# Patient Record
Sex: FEMALE | Race: BLACK OR AFRICAN AMERICAN | NOT HISPANIC OR LATINO | Employment: UNEMPLOYED | URBAN - METROPOLITAN AREA
[De-identification: names, ages, dates, MRNs, and addresses within clinical notes are randomized per-mention and may not be internally consistent; named-entity substitution may affect disease eponyms.]

---

## 2017-02-22 ENCOUNTER — HOSPITAL ENCOUNTER (OUTPATIENT)
Dept: RADIOLOGY | Facility: HOSPITAL | Age: 52
Discharge: HOME | End: 2017-02-22
Attending: INTERNAL MEDICINE
Payer: MEDICAID

## 2017-02-22 DIAGNOSIS — R79.89 ABNORMAL LIVER FUNCTION TESTS: ICD-10-CM

## 2017-02-22 PROCEDURE — A9585 GADOBUTROL INJECTION: HCPCS | Performed by: INTERNAL MEDICINE

## 2017-02-22 PROCEDURE — 74183 MRI ABD W/O CNTR FLWD CNTR: CPT | Mod: TC

## 2017-02-22 PROCEDURE — 25500020 PHARM REV CODE 255: Performed by: INTERNAL MEDICINE

## 2017-02-22 PROCEDURE — 74183 MRI ABD W/O CNTR FLWD CNTR: CPT | Mod: 26,,, | Performed by: RADIOLOGY

## 2017-02-22 RX ORDER — GADOBUTROL 604.72 MG/ML
4 INJECTION INTRAVENOUS
Status: COMPLETED | OUTPATIENT
Start: 2017-02-22 | End: 2017-02-22

## 2017-02-22 RX ADMIN — GADOBUTROL 4 ML: 604.72 INJECTION INTRAVENOUS at 03:02

## 2017-02-23 ENCOUNTER — TELEPHONE (OUTPATIENT)
Dept: HEPATOLOGY | Facility: CLINIC | Age: 52
End: 2017-02-23

## 2017-02-23 NOTE — TELEPHONE ENCOUNTER
----- Message from Tobin Ortiz MD sent at 2/23/2017  2:53 PM CST -----  Please let her know that her MRI did not show anything abnormal although they were unable to finish the test.

## 2017-02-24 ENCOUNTER — TELEPHONE (OUTPATIENT)
Dept: HEPATOLOGY | Facility: CLINIC | Age: 52
End: 2017-02-24

## 2017-02-24 NOTE — TELEPHONE ENCOUNTER
----- Message from Cassidy Segovia sent at 2/24/2017  2:03 PM CST -----  Contact: patient  States that whatever dr monahan have to tell her he can mail it to her pt couldn't make it to her appt today so she said just tell him to mail whatever it is to her

## 2017-03-15 DIAGNOSIS — R93.2 ABNORMAL LIVER DIAGNOSTIC IMAGING: Primary | ICD-10-CM

## 2017-03-20 NOTE — TELEPHONE ENCOUNTER
MA called patient to schedule her MRI, patient stated NO she will not do another MRI she stated that they messed up her VEIN last time she had MRI, they have hard time putting her IV on and she said that her VEIN are all messed up now. After that incident she said she always feel tingling on her arm. She wants to know what are we going to do now for her? Route message to Dr. Ortiz

## 2017-03-21 NOTE — TELEPHONE ENCOUNTER
MA called patient to schedule her follow up visit. Patient accepted 4/10/17 mailed appt reminder to patient. NICA

## 2017-04-04 ENCOUNTER — TELEPHONE (OUTPATIENT)
Dept: HEPATOLOGY | Facility: CLINIC | Age: 52
End: 2017-04-04

## 2017-04-04 NOTE — TELEPHONE ENCOUNTER
----- Message from Bebe Santacruz sent at 4/4/2017  2:40 PM CDT -----  Contact: pt  Calling to speak with Shaniqua if she can switch her appt from 4/10 to 4/11 with Dr Ortiz she is coming on 4/11 with her Mom to the clinic and wants to come on same day they live out of town please call her @ # [phone].

## 2017-04-04 NOTE — TELEPHONE ENCOUNTER
Ma CALLED patient back, patient is begging to be seen 4/11 instead of 4/10 patient stated that her mother who is her ride will be here 4/11 and her mother will not drive her 4/10.     Route message to MD.

## 2017-04-10 ENCOUNTER — TELEPHONE (OUTPATIENT)
Dept: HEPATOLOGY | Facility: CLINIC | Age: 52
End: 2017-04-10

## 2017-04-10 NOTE — TELEPHONE ENCOUNTER
MA called patient back, inform her that unfortunately we did not hear anything from MD. She is willing to reschedule her appt. She accepted 4/28/17 mailed appt reminder to patient.NICA

## 2017-04-10 NOTE — TELEPHONE ENCOUNTER
----- Message from Nicole Barroso MA sent at 4/7/2017  4:30 PM CDT -----  Contact: patient      ----- Message -----     From: Cassidy Segovia     Sent: 4/7/2017   4:22 PM       To: [facility] Non-Clinical Staff    Requesting a return call regarding appt that's on Tuesday need for you to call her back on Monday please call

## 2017-04-28 ENCOUNTER — OFFICE VISIT (OUTPATIENT)
Dept: HEPATOLOGY | Facility: CLINIC | Age: 52
End: 2017-04-28
Payer: MEDICAID

## 2017-04-28 VITALS
HEART RATE: 91 BPM | SYSTOLIC BLOOD PRESSURE: 142 MMHG | RESPIRATION RATE: 18 BRPM | WEIGHT: 142.44 LBS | BODY MASS INDEX: 24.32 KG/M2 | DIASTOLIC BLOOD PRESSURE: 78 MMHG | OXYGEN SATURATION: 100 % | HEIGHT: 64 IN | TEMPERATURE: 98 F

## 2017-04-28 DIAGNOSIS — K76.9 LIVER LESION: Primary | ICD-10-CM

## 2017-04-28 PROCEDURE — 99214 OFFICE O/P EST MOD 30 MIN: CPT | Mod: S$PBB,,, | Performed by: INTERNAL MEDICINE

## 2017-04-28 PROCEDURE — 99213 OFFICE O/P EST LOW 20 MIN: CPT | Mod: PBBFAC | Performed by: INTERNAL MEDICINE

## 2017-04-28 PROCEDURE — 99999 PR PBB SHADOW E&M-EST. PATIENT-LVL III: CPT | Mod: PBBFAC,,, | Performed by: INTERNAL MEDICINE

## 2017-04-28 RX ORDER — LISINOPRIL 20 MG/1
20 TABLET ORAL DAILY
Refills: 0 | COMMUNITY
Start: 2017-04-01

## 2017-04-28 NOTE — MR AVS SNAPSHOT
"    [facility] - Hepatology  [address]  [place] [address]  Phone: [phone]  Fax: [phone]                  Amira Rose   2017 4:20 PM   Office Visit    Description:  Female : 1965   Provider:  Tobin Ortiz MD   Department:  [facility]           Reason for Visit     Follow-up                To Do List           Goals (5 Years of Data)     None      [facility] On [facility]     [facility] On Call Nurse Care Line -  Assistance  Unless otherwise directed by your provider, please contact [facility], our nurse care line that is available for  assistance.     Registered nurses in the [facility] Center provide: appointment scheduling, clinical advisement, health education, and other advisory services.  Call: [phone] (toll free)               Medications           Message regarding Medications     Verify the changes and/or additions to your medication regime listed below are the same as discussed with your clinician today.  If any of these changes or additions are incorrect, please notify your healthcare provider.        STOP taking these medications     benazepril (LOTENSIN) 20 MG tablet     amlodipine-benazepril 10-20mg (LOTREL) 10-20 mg per capsule     amlodipine (NORVASC) 10 MG tablet Take 10 mg by mouth once daily.           Verify that the below list of medications is an accurate representation of the medications you are currently taking.  If none reported, the list may be blank. If incorrect, please contact your healthcare provider. Carry this list with you in case of emergency.           Current Medications     lisinopril (PRINIVIL,ZESTRIL) 20 MG tablet Take 20 mg by mouth once daily.    simvastatin (ZOCOR) 20 MG tablet            Clinical Reference Information           Your Vitals Were     BP Pulse Temp Resp Height Weight    142/78 (BP Location: Left arm, Patient Position: Sitting) 91 98.3 °F (36.8 °C) (Oral) 18 5' 4" (1.626 m) 64.6 kg (142 " lb 6.7 oz)    SpO2 BMI             100% 24.45 kg/m2         Blood Pressure          Most Recent Value    BP  (!)  142/78      Allergies as of 4/28/2017     No Known Allergies      Immunizations Administered on Date of Encounter - 4/28/2017     None      Maintenance Dialysis History     Patient has no recorded history of maintenance dialysis.      [facility] Sign-Up     Activating your [facility] account is as easy as 1-2-3!     1) Visit [email or web address], select Sign Up Now, enter this activation code and your date of birth, then select Next.  [email or web address]  Expires: 6/12/2017  4:30 PM      2) Create a username and password to use when you visit [facility] in the future and select a security question in case you lose your password and select Next.    3) Enter your e-mail address and click Sign Up!    Additional Information  If you have questions, please e-mail [email or web address] or call [phone] to talk to our [facility] staff. Remember, [facility] is NOT to be used for urgent needs. For medical emergencies, dial 911.         Language Assistance Services     ATTENTION: Language assistance services are available, free of charge. Please call [phone].      ATENCIÓN: Si amishla dharmesh, tiene a ford disposición servicios gratuitos de asistencia lingüística. Llame al [phone].     JOSE DE JESUS Ý: N?u b?n nói Ti?ng Vi?t, có các d?ch v? h? tr? ngôn ng? mi?n phí dành cho b?n. G?i s? [phone].         [facility] complies with applicable Federal civil rights laws and does not discriminate on the basis of race, color, national origin, age, disability, or sex.

## 2017-04-28 NOTE — PROGRESS NOTES
Subjective:       Patient ID: Amira Rose is a 51 y.o. female.    Chief Complaint: Follow-up and Abnormal Abdominal/Liver Imaging    HPI  I saw this 51 y.o. AA lady back in clinic for follow up.She is extremely anxious and remains very concerned about her health and dying.    I first met her in March 2015 and we have been following indeterminate and stable liver lesions over the last 2 years. She has not been very consistent with imaging and appointment follow up and has missed a number of them.    - weight stable for last 2 years.  - eating well  - no edema  - no jaundice    No liver history  - found to have an abnormal liver CT with a small focal lesion not seen on MRI  - stable but indeterminate lesions since Oct 2014  - also thought to have early ?cirrhosis    Investigations:  Normal LFTs  Fibrosure F0  Elastography: F0    CT April 2015  Again identified are two separate lesions within the left hepatic lobe which demonstrate early arterial enhancement without evidence washout or pseudocapsule. The lesions are overall indeterminate but appear stable when compared to prior CT on 11/14/14.  Larger lesion in segment II/III likely corresponds with abnormality identified on recent ultrasound.    MRI abdo 8/30/16  2 hepatic lesions as detailed above, remain indeterminate however stable in size since prior exam.    MRI abdo: 2/22/2017   Please note that the exam was terminated prematurely secondary to patient discomfort at the site of IV gadolinium injection.  No postcontrast images were obtained. No hepatic masses identified on this noncontrast examination.  Bilateral renal cysts.  Stable 1.9 cm cystic mass in the spleen.    PMH:  Abdominal cyst removed ?ovarian- 2005  C section  Emergency hysterectomy  High lipids  Hypertension    SH:  No alcohol- no drugs  Lives alone- kids 24 + 20  unemployed    FH:  Nil      Review of Systems   Constitutional: Negative for activity change, appetite change, chills, fatigue,  fever and unexpected weight change.   HENT: Negative for ear pain, hearing loss, nosebleeds, sore throat and trouble swallowing.    Eyes: Negative for redness and visual disturbance.   Respiratory: Negative for cough, chest tightness, shortness of breath and wheezing.    Cardiovascular: Negative for chest pain and palpitations.   Gastrointestinal: Negative for abdominal distention, abdominal pain, blood in stool, constipation, diarrhea, nausea and vomiting.   Genitourinary: Negative for difficulty urinating, dysuria, frequency, hematuria and urgency.   Musculoskeletal: Negative for arthralgias, back pain, gait problem, joint swelling and myalgias.   Skin: Negative for rash.   Neurological: Negative for tremors, seizures, speech difficulty, weakness and headaches.   Hematological: Negative for adenopathy.   Psychiatric/Behavioral: Negative for confusion, decreased concentration and sleep disturbance. The patient is not nervous/anxious.            Lab Results   Component Value Date    ALT 2016    AST 24 2016    GGT 19 2015    GGT 25 2015    ALKPHOS 56 2016    BILITOT 0.5 2016     Past Medical History:   Diagnosis Date    High cholesterol     Hypertension     Thrombocytopenia      Past Surgical History:   Procedure Laterality Date     SECTION      x2    EYE SURGERY      HYSTERECTOMY      for heavy bleeding    OVARIAN CYST REMOVAL       Current Outpatient Prescriptions   Medication Sig    lisinopril (PRINIVIL,ZESTRIL) 20 MG tablet Take 20 mg by mouth once daily.    simvastatin (ZOCOR) 20 MG tablet      No current facility-administered medications for this visit.        Objective:      Physical Exam   Constitutional: She appears well-nourished. No distress.   HENT:   Head: Normocephalic.   Eyes: Pupils are equal, round, and reactive to light.   Neck: No JVD present. No tracheal deviation present. No thyromegaly present.   Cardiovascular: Normal rate, regular  rhythm and normal heart sounds.    No murmur heard.  Pulmonary/Chest: Effort normal and breath sounds normal. No stridor.   Abdominal: Soft.   Lymphadenopathy:        Head (right side): No submental, no submandibular, no tonsillar, no preauricular, no posterior auricular and no occipital adenopathy present.        Head (left side): No submental, no submandibular, no tonsillar, no preauricular, no posterior auricular and no occipital adenopathy present.     She has no cervical adenopathy.     She has no axillary adenopathy.   Neurological: She is alert. She has normal strength. She is not disoriented. No cranial nerve deficit or sensory deficit.   Skin: Skin is intact. No cyanosis.       Assessment:       1. Liver lesion        Plan:   Reassured  No evidence of liver disease- unfortunately her latest imaging was suboptimal because of issues with IV access and contrast injection but given her imaging stability, I think we are dealing with a benign process.    She is reluctant to have another contrast enhanced exam so we will proceed with an US at this point and I will see her in 6 months.  - fibroscan shows no liver fibrosis.  - labs with tumor markers

## 2017-12-01 ENCOUNTER — TELEPHONE (OUTPATIENT)
Dept: HEPATOLOGY | Facility: CLINIC | Age: 52
End: 2017-12-01

## 2017-12-01 NOTE — TELEPHONE ENCOUNTER
----- Message from Savita Barroso sent at 12/1/2017 12:58 PM CST -----  Contact: PT  PT is calling regarding a missed call.  PT doesn't have voicemail set up so she's unsure who really called her.  PT is due for a 6 months check up     Callback: [phone]

## 2017-12-01 NOTE — TELEPHONE ENCOUNTER
MA called patient back, schedule her labs, US and follow up visit. Mailed appt reminder to patient.NCIA

## 2018-04-18 ENCOUNTER — PROCEDURE VISIT (OUTPATIENT)
Dept: HEPATOLOGY | Facility: CLINIC | Age: 53
End: 2018-04-18
Attending: INTERNAL MEDICINE
Payer: MEDICAID

## 2018-04-18 ENCOUNTER — OFFICE VISIT (OUTPATIENT)
Dept: HEPATOLOGY | Facility: CLINIC | Age: 53
End: 2018-04-18
Payer: MEDICAID

## 2018-04-18 ENCOUNTER — HOSPITAL ENCOUNTER (OUTPATIENT)
Dept: RADIOLOGY | Facility: HOSPITAL | Age: 53
Discharge: HOME | End: 2018-04-18
Attending: INTERNAL MEDICINE
Payer: MEDICAID

## 2018-04-18 VITALS
OXYGEN SATURATION: 98 % | TEMPERATURE: 98 F | BODY MASS INDEX: 26.46 KG/M2 | HEART RATE: 102 BPM | SYSTOLIC BLOOD PRESSURE: 129 MMHG | DIASTOLIC BLOOD PRESSURE: 74 MMHG | WEIGHT: 155 LBS | RESPIRATION RATE: 18 BRPM | HEIGHT: 64 IN

## 2018-04-18 DIAGNOSIS — D49.0 LIVER NEOPLASM: ICD-10-CM

## 2018-04-18 DIAGNOSIS — R93.2 ABNORMAL LIVER ULTRASOUND: ICD-10-CM

## 2018-04-18 DIAGNOSIS — K76.9 LIVER LESION: ICD-10-CM

## 2018-04-18 DIAGNOSIS — R93.2 ABNORMAL LIVER ULTRASOUND: Primary | ICD-10-CM

## 2018-04-18 PROCEDURE — 91200 LIVER ELASTOGRAPHY: CPT | Mod: PBBFAC | Performed by: INTERNAL MEDICINE

## 2018-04-18 PROCEDURE — 76700 US EXAM ABDOM COMPLETE: CPT | Mod: TC

## 2018-04-18 PROCEDURE — 91200 LIVER ELASTOGRAPHY: CPT | Mod: 26,S$PBB,, | Performed by: INTERNAL MEDICINE

## 2018-04-18 PROCEDURE — 99999 PR PBB SHADOW E&M-EST. PATIENT-LVL IV: CPT | Mod: PBBFAC,,, | Performed by: INTERNAL MEDICINE

## 2018-04-18 PROCEDURE — 76700 US EXAM ABDOM COMPLETE: CPT | Mod: 26,,, | Performed by: RADIOLOGY

## 2018-04-18 PROCEDURE — 99214 OFFICE O/P EST MOD 30 MIN: CPT | Mod: S$PBB,,, | Performed by: INTERNAL MEDICINE

## 2018-04-18 PROCEDURE — 99214 OFFICE O/P EST MOD 30 MIN: CPT | Mod: PBBFAC,25 | Performed by: INTERNAL MEDICINE

## 2018-04-18 NOTE — PROCEDURES
Procedures   Fibroscan Procedure     Name: Amira Rose  Date of Procedure : 2018   :: Tobin Ortiz MD  Diagnosis: NAFLD    Probe: M    Fibroscan readin KPa    Fibrosis:F 0-1     CAP readin dB/m    Steatosis: :<S1

## 2018-04-18 NOTE — PROGRESS NOTES
Subjective:       Patient ID: Amira Rose is a 52 y.o. female.    Chief Complaint: Liver lesion    HPI  I saw this 52 y.o. AA lady back in clinic for follow up.She is extremely anxious and remains very concerned about her health and dying.    I first met her in March 2015 and we have been following indeterminate and stable liver lesions over the last 2 years. She has not been very consistent with imaging and appointment follow up and has missed a number of them.    - weight stable for last 2 years.  - eating well  - no edema  - no jaundice    No liver history  - found to have an abnormal liver CT with a small focal lesion not seen on MRI  - stable but indeterminate lesions since Oct 2014  - also thought to have early ?cirrhosis    Investigations:  Normal LFTs  Fibrosure F0  Elastography: F0    AFP 2    CT April 2015  Again identified are two separate lesions within the left hepatic lobe which demonstrate early arterial enhancement without evidence washout or pseudocapsule. The lesions are overall indeterminate but appear stable when compared to prior CT on 11/14/14.  Larger lesion in segment II/III likely corresponds with abnormality identified on recent ultrasound.    MRI abdo 8/30/16  2 hepatic lesions as detailed above, remain indeterminate however stable in size since prior exam.    MRI abdo: 2/22/2017   Please note that the exam was terminated prematurely secondary to patient discomfort at the site of IV gadolinium injection.  No postcontrast images were obtained. No hepatic masses identified on this noncontrast examination.  Bilateral renal cysts.  Stable 1.9 cm cystic mass in the spleen.    PMH:  Abdominal cyst removed ?ovarian- 2005  C section  Emergency hysterectomy  High lipids  Hypertension    SH:  No alcohol- no drugs  Lives alone- kids 24 + 20  unemployed    FH:  Nil      Review of Systems   Constitutional: Negative for activity change, appetite change, chills, fatigue, fever and unexpected  weight change.   HENT: Negative for ear pain, hearing loss, nosebleeds, sore throat and trouble swallowing.    Eyes: Negative for redness and visual disturbance.   Respiratory: Negative for cough, chest tightness, shortness of breath and wheezing.    Cardiovascular: Negative for chest pain and palpitations.   Gastrointestinal: Negative for abdominal distention, abdominal pain, blood in stool, constipation, diarrhea, nausea and vomiting.   Genitourinary: Negative for difficulty urinating, dysuria, frequency, hematuria and urgency.   Musculoskeletal: Negative for arthralgias, back pain, gait problem, joint swelling and myalgias.   Skin: Negative for rash.   Neurological: Negative for tremors, seizures, speech difficulty, weakness and headaches.   Hematological: Negative for adenopathy.   Psychiatric/Behavioral: Negative for confusion, decreased concentration and sleep disturbance. The patient is not nervous/anxious.            Lab Results   Component Value Date    ALT 2018    AST 21 2018    GGT 19 2015    GGT 25 2015    ALKPHOS 58 2018    BILITOT 0.5 2018     Past Medical History:   Diagnosis Date    High cholesterol     Hypertension     Thrombocytopenia      Past Surgical History:   Procedure Laterality Date     SECTION      x2    EYE SURGERY      HYSTERECTOMY      for heavy bleeding    OVARIAN CYST REMOVAL       Current Outpatient Prescriptions   Medication Sig    lisinopril (PRINIVIL,ZESTRIL) 20 MG tablet Take 20 mg by mouth once daily.    simvastatin (ZOCOR) 20 MG tablet      No current facility-administered medications for this visit.        Objective:      Physical Exam   Constitutional: She appears well-nourished. No distress.   HENT:   Head: Normocephalic.   Eyes: Pupils are equal, round, and reactive to light.   Neck: No JVD present. No tracheal deviation present. No thyromegaly present.   Cardiovascular: Normal rate, regular rhythm and normal heart  sounds.    No murmur heard.  Pulmonary/Chest: Effort normal and breath sounds normal. No stridor.   Abdominal: Soft.   Lymphadenopathy:        Head (right side): No submental, no submandibular, no tonsillar, no preauricular, no posterior auricular and no occipital adenopathy present.        Head (left side): No submental, no submandibular, no tonsillar, no preauricular, no posterior auricular and no occipital adenopathy present.     She has no cervical adenopathy.     She has no axillary adenopathy.   Neurological: She is alert. She has normal strength. She is not disoriented. No cranial nerve deficit or sensory deficit.   Skin: Skin is intact. No cyanosis.       Assessment:       1. Abnormal liver ultrasound    2. Liver neoplasm    3. Liver lesion        Plan:   Reassured  She remains well.    No evidence of liver disease- unfortunately her latest imaging was suboptimal because of issues with IV access and contrast injection but given her imaging stability, I think we are dealing with a benign process.    - fibroscan shows no liver fibrosis (F0-1, S0).  - labs with tumor markers  - [facility]    Clinic in 6 months.

## 2018-04-30 ENCOUNTER — TELEPHONE (OUTPATIENT)
Dept: HEPATOLOGY | Facility: CLINIC | Age: 53
End: 2018-04-30

## 2018-04-30 NOTE — TELEPHONE ENCOUNTER
----- Message from Tobin Ortiz MD sent at 4/30/2018  3:24 PM CDT -----  Please let her know that her MRI is stable- no concerns.

## 2018-05-08 NOTE — TELEPHONE ENCOUNTER
Patient called inform her of her MRI results per Dr. Ortiz her MRI is stable no concers. Patient understood and verbalized understanding.     She want to ask Dr. Ortiz if that cyst on her liver can be remove.    Route message to MD

## 2018-10-08 DIAGNOSIS — K76.9 LIVER LESION: ICD-10-CM

## 2018-10-08 DIAGNOSIS — R93.2 ABNORMAL LIVER ULTRASOUND: Primary | ICD-10-CM

## 2018-12-07 ENCOUNTER — OFFICE VISIT (OUTPATIENT)
Dept: HEPATOLOGY | Facility: CLINIC | Age: 53
End: 2018-12-07
Payer: MEDICAID

## 2018-12-07 VITALS
SYSTOLIC BLOOD PRESSURE: 111 MMHG | TEMPERATURE: 96 F | RESPIRATION RATE: 18 BRPM | WEIGHT: 154.75 LBS | BODY MASS INDEX: 26.42 KG/M2 | HEIGHT: 64 IN | OXYGEN SATURATION: 96 % | DIASTOLIC BLOOD PRESSURE: 64 MMHG | HEART RATE: 106 BPM

## 2018-12-07 DIAGNOSIS — K76.9 LIVER LESION: Primary | ICD-10-CM

## 2018-12-07 PROCEDURE — 99213 OFFICE O/P EST LOW 20 MIN: CPT | Mod: PBBFAC | Performed by: INTERNAL MEDICINE

## 2018-12-07 PROCEDURE — 99999 PR PBB SHADOW E&M-EST. PATIENT-LVL III: CPT | Mod: PBBFAC,,, | Performed by: INTERNAL MEDICINE

## 2018-12-07 PROCEDURE — 99214 OFFICE O/P EST MOD 30 MIN: CPT | Mod: S$PBB,,, | Performed by: INTERNAL MEDICINE

## 2018-12-07 NOTE — PROGRESS NOTES
Subjective:       Patient ID: Amira Rose is a 53 y.o. female.    Chief Complaint: Mass    HPI  I saw this 53 y.o. lady back in clinic for follow up.She is extremely anxious and remains very concerned about her health and dying.    I first met her in March 2015 and we have been following indeterminate and stable liver lesions over the last 3 years. She has not been very consistent with imaging and appointment follow up and has missed a number of them.    - weight stable for last 2 years.  - eating well  - no edema  - no jaundice    No liver history  - found to have an abnormal liver CT with a small focal lesion not seen on MRI  - stable but indeterminate lesions since Oct 2014  - also thought to have early ?cirrhosis    Investigations:  Normal LFTs  Fibrosure F0  Elastography: F0    AFP 2    CT April 2015  Again identified are two separate lesions within the left hepatic lobe which demonstrate early arterial enhancement without evidence washout or pseudocapsule. The lesions are overall indeterminate but appear stable when compared to prior CT on 11/14/14.  Larger lesion in segment II/III likely corresponds with abnormality identified on recent ultrasound.    MRI abdo 8/30/16  2 hepatic lesions as detailed above, remain indeterminate however stable in size since prior exam.    MRI abdo: 2/22/2017   Please note that the exam was terminated prematurely secondary to patient discomfort at the site of IV gadolinium injection.  No postcontrast images were obtained. No hepatic masses identified on this noncontrast examination.  Bilateral renal cysts.  Stable 1.9 cm cystic mass in the spleen.    MRI abdo: 12/6/2018  1. Stable 1.3 cm subtle enhancing mass in the anterior left lobe of the liver.  No new suspicious liver lesions are noted.  2. Nonvisualization of previously described 1.3 cm lesion in the posterior left lobe of the liver.    PMH:  Abdominal cyst removed ?ovarian- 2005  C section  Emergency  hysterectomy  High lipids  Hypertension    SH:  No alcohol- no drugs  Lives alone- kids 24 + 20  unemployed    FH:  Nil      Review of Systems   Constitutional: Negative for activity change, appetite change, chills, fatigue, fever and unexpected weight change.   HENT: Negative for ear pain, hearing loss, nosebleeds, sore throat and trouble swallowing.    Eyes: Negative for redness and visual disturbance.   Respiratory: Negative for cough, chest tightness, shortness of breath and wheezing.    Cardiovascular: Negative for chest pain and palpitations.   Gastrointestinal: Negative for abdominal distention, abdominal pain, blood in stool, constipation, diarrhea, nausea and vomiting.   Genitourinary: Negative for difficulty urinating, dysuria, frequency, hematuria and urgency.   Musculoskeletal: Negative for arthralgias, back pain, gait problem, joint swelling and myalgias.   Skin: Negative for rash.   Neurological: Negative for tremors, seizures, speech difficulty, weakness and headaches.   Hematological: Negative for adenopathy.   Psychiatric/Behavioral: Negative for confusion, decreased concentration and sleep disturbance. The patient is not nervous/anxious.            Lab Results   Component Value Date    ALT 2018    AST 21 2018    GGT 19 2015    GGT 25 2015    ALKPHOS 58 2018    BILITOT 0.5 2018     Past Medical History:   Diagnosis Date    High cholesterol     Hypertension     Thrombocytopenia      Past Surgical History:   Procedure Laterality Date     SECTION      x2    EYE SURGERY      HYSTERECTOMY      for heavy bleeding    OVARIAN CYST REMOVAL       Current Outpatient Medications   Medication Sig    lisinopril (PRINIVIL,ZESTRIL) 20 MG tablet Take 20 mg by mouth once daily.    simvastatin (ZOCOR) 20 MG tablet      No current facility-administered medications for this visit.        Objective:      Physical Exam   Constitutional: She appears  well-nourished. No distress.   HENT:   Head: Normocephalic.   Eyes: Pupils are equal, round, and reactive to light.   Neck: No JVD present. No tracheal deviation present. No thyromegaly present.   Cardiovascular: Normal rate, regular rhythm and normal heart sounds.   No murmur heard.  Pulmonary/Chest: Effort normal and breath sounds normal. No stridor.   Abdominal: Soft.   Lymphadenopathy:        Head (right side): No submental, no submandibular, no tonsillar, no preauricular, no posterior auricular and no occipital adenopathy present.        Head (left side): No submental, no submandibular, no tonsillar, no preauricular, no posterior auricular and no occipital adenopathy present.     She has no cervical adenopathy.     She has no axillary adenopathy.   Neurological: She is alert. She has normal strength. She is not disoriented. No cranial nerve deficit or sensory deficit.   Skin: Skin is intact. No cyanosis.       Assessment:       1. Liver lesion        Plan:   Reassured  She remains well.    No evidence of liver disease-  I think we are dealing with a benign liver lesion.    - fibroscan shows no liver fibrosis (F0-1, S0).  - labs with tumor markers awaited from today  - clinic in 6 months with US/labs

## 2018-12-10 ENCOUNTER — TELEPHONE (OUTPATIENT)
Dept: HEPATOLOGY | Facility: CLINIC | Age: 53
End: 2018-12-10

## 2019-03-01 PROBLEM — J06.9 VIRAL URI WITH COUGH: Status: ACTIVE | Noted: 2019-03-01

## 2019-06-04 DIAGNOSIS — K76.9 LIVER LESION: Primary | ICD-10-CM

## 2024-08-09 ENCOUNTER — OFFICE VISIT (OUTPATIENT)
Dept: URGENT CARE | Facility: CLINIC | Age: 59
End: 2024-08-09
Payer: MEDICAID

## 2024-08-09 VITALS
SYSTOLIC BLOOD PRESSURE: 120 MMHG | TEMPERATURE: 100 F | WEIGHT: 166 LBS | DIASTOLIC BLOOD PRESSURE: 78 MMHG | BODY MASS INDEX: 28.34 KG/M2 | HEIGHT: 64 IN | HEART RATE: 114 BPM | RESPIRATION RATE: 18 BRPM | OXYGEN SATURATION: 98 %

## 2024-08-09 DIAGNOSIS — U07.1 COVID: ICD-10-CM

## 2024-08-09 DIAGNOSIS — R50.9 FEVER, UNSPECIFIED FEVER CAUSE: ICD-10-CM

## 2024-08-09 DIAGNOSIS — R53.83 FATIGUE, UNSPECIFIED TYPE: Primary | ICD-10-CM

## 2024-08-09 LAB
CTP QC/QA: YES
SARS-COV-2 AG RESP QL IA.RAPID: POSITIVE

## 2024-08-09 RX ORDER — IPRATROPIUM BROMIDE 21 UG/1
2 SPRAY, METERED NASAL 3 TIMES DAILY
Qty: 30 ML | Refills: 0 | Status: SHIPPED | OUTPATIENT
Start: 2024-08-09

## 2024-08-09 RX ORDER — TRIPROLIDINE/PSEUDOEPHEDRINE 2.5MG-60MG
400 TABLET ORAL
Status: COMPLETED | OUTPATIENT
Start: 2024-08-09 | End: 2024-08-09

## 2024-08-09 RX ORDER — CETIRIZINE HYDROCHLORIDE 10 MG/1
10 TABLET ORAL DAILY
Qty: 30 TABLET | Refills: 0 | Status: SHIPPED | OUTPATIENT
Start: 2024-08-09 | End: 2025-08-09

## 2024-08-09 RX ORDER — TRIPROLIDINE/PSEUDOEPHEDRINE 2.5MG-60MG
400 TABLET ORAL EVERY 6 HOURS PRN
Qty: 118 ML | Refills: 0 | Status: SHIPPED | OUTPATIENT
Start: 2024-08-09

## 2024-08-09 RX ORDER — IBUPROFEN 200 MG
400 TABLET ORAL
Status: DISCONTINUED | OUTPATIENT
Start: 2024-08-09 | End: 2024-08-09

## 2024-08-09 RX ADMIN — Medication 400 MG: at 02:08

## 2024-08-10 ENCOUNTER — TELEPHONE (OUTPATIENT)
Dept: URGENT CARE | Facility: CLINIC | Age: 59
End: 2024-08-10
Payer: MEDICAID

## 2024-08-10 NOTE — TELEPHONE ENCOUNTER
Pt called with question about the safety of taking blood pressure and cholesterol medication with zyrtec. Pt spoke with maikel beltre .          ----- Message from Rosi Son sent at 8/10/2024  1:14 PM CDT -----  Contact: [phone] pt  [email or web address]     Patient is calling for Medical Advice regarding:Requesting a call to discuss 08/09/2024 visit.    How long has patient had these symptoms:    Pharmacy name and phone#:    Patient wants a call back or thru [facility]:call back    Comments:Please call and advise    Please advise patient replies from provider may take up to 48 hours.